# Patient Record
Sex: FEMALE | Race: WHITE | NOT HISPANIC OR LATINO | URBAN - NONMETROPOLITAN AREA
[De-identification: names, ages, dates, MRNs, and addresses within clinical notes are randomized per-mention and may not be internally consistent; named-entity substitution may affect disease eponyms.]

---

## 2018-05-31 PROBLEM — H25.13: Noted: 2018-05-31

## 2018-05-31 PROBLEM — H02.834: Noted: 2018-05-31

## 2018-05-31 PROBLEM — H52.13: Noted: 2018-05-31

## 2018-05-31 PROBLEM — H52.4: Noted: 2018-05-31

## 2018-05-31 PROBLEM — H02.831: Noted: 2018-05-31

## 2019-06-03 ENCOUNTER — IMPORTED ENCOUNTER (OUTPATIENT)
Dept: URBAN - NONMETROPOLITAN AREA CLINIC 1 | Facility: CLINIC | Age: 69
End: 2019-06-03

## 2019-06-03 PROCEDURE — 99214 OFFICE O/P EST MOD 30 MIN: CPT

## 2019-06-03 NOTE — PATIENT DISCUSSION
Mild cataracts OU. Discussed dry eyes. Do not rub. Use Pataday qd (or Patanol bid) OU for itchy eyes.

## 2020-06-05 ENCOUNTER — IMPORTED ENCOUNTER (OUTPATIENT)
Dept: URBAN - NONMETROPOLITAN AREA CLINIC 1 | Facility: CLINIC | Age: 70
End: 2020-06-05

## 2020-06-05 PROCEDURE — 92014 COMPRE OPH EXAM EST PT 1/>: CPT

## 2020-06-05 NOTE — PATIENT DISCUSSION
Cataract OU-Not yet surgical. -Reviewed symptoms of advancing cataract growth such as glare and halos and decreased vision.-Continue to monitor for now. Pt will notify us if any new symptoms develop. DERMATOCHALASIS OUMONITOR FOR CHANGESPresbyopia-Discussed diagnosis with patient. Myopia-Discussed diagnosis with patient. -Explained that people who are myopic are at a higher risk for developing RD/RT and reviewed associated S&S.-Pt to contact our office if symptoms develop. Updated spec Rx given. Recommend lens that will provide comfort as well as protect safety and health of eyes.

## 2021-06-07 ENCOUNTER — IMPORTED ENCOUNTER (OUTPATIENT)
Dept: URBAN - NONMETROPOLITAN AREA CLINIC 1 | Facility: CLINIC | Age: 71
End: 2021-06-07

## 2021-06-07 PROCEDURE — 92014 COMPRE OPH EXAM EST PT 1/>: CPT

## 2021-06-07 NOTE — PATIENT DISCUSSION
Cataract OU +progression-Not yet surgical. -Reviewed symptoms of advancing cataract growth such as glare and halos and decreased vision.-Continue to monitor for now. Pt will notify us if any new symptoms develop. DERMATOCHALASIS OUMONITOR FOR CHANGESPresbyopia-Discussed diagnosis with patient. Myopia-Discussed diagnosis with patient. -Explained that people who are myopic are at a higher risk for developing RD/RT and reviewed associated S&S.-Pt to contact our office if symptoms develop. Updated spec Rx given. Recommend lens that will provide comfort as well as protect safety and health of eyes.

## 2022-04-10 ASSESSMENT — TONOMETRY
OS_IOP_MMHG: 12
OS_IOP_MMHG: 14
OD_IOP_MMHG: 14
OD_IOP_MMHG: 14
OS_IOP_MMHG: 14
OD_IOP_MMHG: 12

## 2022-04-10 ASSESSMENT — VISUAL ACUITY
OD_SC: 20/30-2
OD_SC: 20/30-1
OD_SC: 20/40
OS_SC: 20/30
OS_CC: J3
OS_SC: 20/30-1
OD_CC: J3
OS_SC: 20/30-2

## 2022-12-15 ENCOUNTER — NEW PATIENT (OUTPATIENT)
Dept: URBAN - METROPOLITAN AREA CLINIC 1 | Facility: CLINIC | Age: 72
End: 2022-12-15

## 2022-12-15 PROCEDURE — 92004 COMPRE OPH EXAM NEW PT 1/>: CPT

## 2022-12-15 ASSESSMENT — VISUAL ACUITY
OS_BAT: 20/80
OS_SC: 20/100
OD_SC: 20/150
OS_CC: 20/30-2
OD_BAT: 20/80

## 2022-12-15 ASSESSMENT — TONOMETRY
OS_IOP_MMHG: 14
OD_IOP_MMHG: 16

## 2022-12-15 NOTE — PATIENT DISCUSSION
Patient complaining of vertical diplopia while reading, will consider slab-off in the future. Will refer patient to Dr. Sean Galvan for further work-up.

## 2022-12-15 NOTE — PATIENT DISCUSSION
Visually Significant, secondary to glare, but will observe for now without intervention. The patient was advised to contact office with any change or worsening of vision. No mrx given to patient today. Will evaluate again in 6 months DFE/Glare.

## 2022-12-15 NOTE — PATIENT DISCUSSION
Previously diagnosed by PCP. Patient reassured and encouraged to identify and avoid triggers. No need for workup.

## 2024-06-06 ENCOUNTER — COMPREHENSIVE EXAM (OUTPATIENT)
Dept: RURAL CLINIC 1 | Facility: CLINIC | Age: 74
End: 2024-06-06

## 2024-06-06 DIAGNOSIS — Z98.890: ICD-10-CM

## 2024-06-06 DIAGNOSIS — H51.11: ICD-10-CM

## 2024-06-06 DIAGNOSIS — H25.13: ICD-10-CM

## 2024-06-06 DIAGNOSIS — H04.123: ICD-10-CM

## 2024-06-06 DIAGNOSIS — H43.813: ICD-10-CM

## 2024-06-06 PROCEDURE — 92014 COMPRE OPH EXAM EST PT 1/>: CPT

## 2024-06-06 ASSESSMENT — TONOMETRY
OD_IOP_MMHG: 17
OS_IOP_MMHG: 17

## 2024-06-06 ASSESSMENT — VISUAL ACUITY
OS_CC: 20/40+1
OU_CC: 20/40
OD_CC: 20/40

## 2024-12-04 ENCOUNTER — FOLLOW UP (OUTPATIENT)
Age: 74
End: 2024-12-04

## 2024-12-04 DIAGNOSIS — H51.11: ICD-10-CM

## 2024-12-04 DIAGNOSIS — H43.813: ICD-10-CM

## 2024-12-04 DIAGNOSIS — H04.123: ICD-10-CM

## 2024-12-04 DIAGNOSIS — Z98.890: ICD-10-CM

## 2024-12-04 DIAGNOSIS — H25.13: ICD-10-CM

## 2024-12-04 PROCEDURE — 92014 COMPRE OPH EXAM EST PT 1/>: CPT

## 2025-07-11 ENCOUNTER — FOLLOW UP (OUTPATIENT)
Age: 75
End: 2025-07-11

## 2025-07-11 DIAGNOSIS — H04.123: ICD-10-CM

## 2025-07-11 DIAGNOSIS — Z98.890: ICD-10-CM

## 2025-07-11 DIAGNOSIS — H43.813: ICD-10-CM

## 2025-07-11 DIAGNOSIS — H25.13: ICD-10-CM

## 2025-07-11 PROCEDURE — 92014 COMPRE OPH EXAM EST PT 1/>: CPT
